# Patient Record
Sex: FEMALE | Race: WHITE | NOT HISPANIC OR LATINO | Employment: OTHER | URBAN - METROPOLITAN AREA
[De-identification: names, ages, dates, MRNs, and addresses within clinical notes are randomized per-mention and may not be internally consistent; named-entity substitution may affect disease eponyms.]

---

## 2017-04-18 ENCOUNTER — LAB VISIT (OUTPATIENT)
Dept: LAB | Facility: HOSPITAL | Age: 77
End: 2017-04-18
Attending: PSYCHIATRY & NEUROLOGY
Payer: MEDICARE

## 2017-04-18 ENCOUNTER — OFFICE VISIT (OUTPATIENT)
Dept: NEUROLOGY | Facility: CLINIC | Age: 77
End: 2017-04-18
Payer: MEDICARE

## 2017-04-18 VITALS
HEART RATE: 80 BPM | DIASTOLIC BLOOD PRESSURE: 60 MMHG | HEIGHT: 64 IN | SYSTOLIC BLOOD PRESSURE: 140 MMHG | WEIGHT: 145.94 LBS | BODY MASS INDEX: 24.92 KG/M2 | RESPIRATION RATE: 16 BRPM

## 2017-04-18 DIAGNOSIS — G43.019 INTRACTABLE MIGRAINE WITHOUT AURA AND WITHOUT STATUS MIGRAINOSUS: Primary | ICD-10-CM

## 2017-04-18 DIAGNOSIS — R55 SYNCOPE, UNSPECIFIED SYNCOPE TYPE: ICD-10-CM

## 2017-04-18 DIAGNOSIS — G60.9 HEREDITARY AND IDIOPATHIC NEUROPATHY: ICD-10-CM

## 2017-04-18 DIAGNOSIS — G43.019 INTRACTABLE MIGRAINE WITHOUT AURA AND WITHOUT STATUS MIGRAINOSUS: ICD-10-CM

## 2017-04-18 LAB
ALBUMIN SERPL BCP-MCNC: 3.7 G/DL
ALP SERPL-CCNC: 67 U/L
ALT SERPL W/O P-5'-P-CCNC: 19 U/L
ANION GAP SERPL CALC-SCNC: 7 MMOL/L
AST SERPL-CCNC: 18 U/L
BASOPHILS # BLD AUTO: 0.02 K/UL
BASOPHILS NFR BLD: 0.4 %
BILIRUB SERPL-MCNC: 0.1 MG/DL
BUN SERPL-MCNC: 24 MG/DL
CALCIUM SERPL-MCNC: 9.1 MG/DL
CHLORIDE SERPL-SCNC: 105 MMOL/L
CO2 SERPL-SCNC: 29 MMOL/L
CREAT SERPL-MCNC: 0.8 MG/DL
DIFFERENTIAL METHOD: ABNORMAL
EOSINOPHIL # BLD AUTO: 0.3 K/UL
EOSINOPHIL NFR BLD: 7.2 %
ERYTHROCYTE [DISTWIDTH] IN BLOOD BY AUTOMATED COUNT: 14.9 %
ERYTHROCYTE [SEDIMENTATION RATE] IN BLOOD BY WESTERGREN METHOD: 26 MM/HR
EST. GFR  (AFRICAN AMERICAN): >60 ML/MIN/1.73 M^2
EST. GFR  (NON AFRICAN AMERICAN): >60 ML/MIN/1.73 M^2
GLUCOSE SERPL-MCNC: 67 MG/DL
HCT VFR BLD AUTO: 31.4 %
HGB BLD-MCNC: 10 G/DL
LYMPHOCYTES # BLD AUTO: 1.5 K/UL
LYMPHOCYTES NFR BLD: 34.2 %
MCH RBC QN AUTO: 27.9 PG
MCHC RBC AUTO-ENTMCNC: 31.8 %
MCV RBC AUTO: 88 FL
MONOCYTES # BLD AUTO: 0.5 K/UL
MONOCYTES NFR BLD: 11.6 %
NEUTROPHILS # BLD AUTO: 2.1 K/UL
NEUTROPHILS NFR BLD: 46.6 %
PLATELET # BLD AUTO: 288 K/UL
PMV BLD AUTO: 9.5 FL
POTASSIUM SERPL-SCNC: 4.4 MMOL/L
PROT SERPL-MCNC: 6.6 G/DL
RBC # BLD AUTO: 3.59 M/UL
SODIUM SERPL-SCNC: 141 MMOL/L
TSH SERPL DL<=0.005 MIU/L-ACNC: 2.29 UIU/ML
WBC # BLD AUTO: 4.47 K/UL

## 2017-04-18 PROCEDURE — 82607 VITAMIN B-12: CPT

## 2017-04-18 PROCEDURE — 85651 RBC SED RATE NONAUTOMATED: CPT

## 2017-04-18 PROCEDURE — 80053 COMPREHEN METABOLIC PANEL: CPT

## 2017-04-18 PROCEDURE — 99999 PR PBB SHADOW E&M-NEW PATIENT-LVL III: CPT | Mod: PBBFAC,,, | Performed by: PSYCHIATRY & NEUROLOGY

## 2017-04-18 PROCEDURE — 84443 ASSAY THYROID STIM HORMONE: CPT

## 2017-04-18 PROCEDURE — 85025 COMPLETE CBC W/AUTO DIFF WBC: CPT

## 2017-04-18 PROCEDURE — 36415 COLL VENOUS BLD VENIPUNCTURE: CPT

## 2017-04-18 RX ORDER — LEVOTHYROXINE SODIUM 75 UG/1
75 TABLET ORAL DAILY
COMMUNITY
End: 2022-02-09

## 2017-04-18 RX ORDER — GABAPENTIN 300 MG/1
300 CAPSULE ORAL 3 TIMES DAILY
COMMUNITY
End: 2017-05-30

## 2017-04-18 RX ORDER — OXYBUTYNIN CHLORIDE 10 MG/1
20 TABLET, EXTENDED RELEASE ORAL DAILY
COMMUNITY
End: 2017-08-30

## 2017-04-18 RX ORDER — DULOXETIN HYDROCHLORIDE 60 MG/1
60 CAPSULE, DELAYED RELEASE ORAL DAILY
Qty: 30 CAPSULE | Refills: 11 | Status: SHIPPED | OUTPATIENT
Start: 2017-04-18 | End: 2017-05-30

## 2017-04-18 RX ORDER — ZOLPIDEM TARTRATE 10 MG/1
TABLET ORAL
Refills: 3 | COMMUNITY
Start: 2017-03-17 | End: 2017-05-30

## 2017-04-18 RX ORDER — OXYCODONE AND ACETAMINOPHEN 10; 325 MG/1; MG/1
TABLET ORAL
Refills: 0 | COMMUNITY
Start: 2017-03-01 | End: 2019-12-05

## 2017-04-18 RX ORDER — ATORVASTATIN CALCIUM 10 MG/1
10 TABLET, FILM COATED ORAL DAILY
COMMUNITY
End: 2017-08-30

## 2017-04-18 RX ORDER — ASPIRIN 81 MG/1
81 TABLET ORAL DAILY
COMMUNITY
End: 2019-03-22 | Stop reason: CLARIF

## 2017-04-18 RX ORDER — DULOXETIN HYDROCHLORIDE 60 MG/1
60 CAPSULE, DELAYED RELEASE ORAL DAILY
COMMUNITY
End: 2017-04-18 | Stop reason: SDUPTHER

## 2017-04-18 RX ORDER — TRIAMTERENE AND HYDROCHLOROTHIAZIDE 37.5; 25 MG/1; MG/1
1 CAPSULE ORAL EVERY OTHER DAY
Status: ON HOLD | COMMUNITY
End: 2017-06-23 | Stop reason: HOSPADM

## 2017-04-18 RX ORDER — OMEPRAZOLE 40 MG/1
40 CAPSULE, DELAYED RELEASE ORAL DAILY
Refills: 3 | COMMUNITY
Start: 2017-03-27 | End: 2023-12-08

## 2017-04-18 RX ORDER — LISINOPRIL 5 MG/1
TABLET ORAL
Refills: 5 | COMMUNITY
Start: 2017-02-09 | End: 2017-08-30

## 2017-04-18 RX ORDER — DICYCLOMINE HYDROCHLORIDE 20 MG/1
20 TABLET ORAL EVERY 6 HOURS PRN
COMMUNITY

## 2017-04-18 RX ORDER — BUTALBITAL, ACETAMINOPHEN AND CAFFEINE 50; 325; 40 MG/1; MG/1; MG/1
TABLET ORAL
Refills: 0 | COMMUNITY
Start: 2017-02-16 | End: 2017-04-18

## 2017-04-18 NOTE — PROGRESS NOTES
Consult from Dr Campos    HPI: Zenia Willams is a 76 y.o. female with headache which started 6 months ago.  She never had headaches or migraine prior. Pain starts on the top of the head bilaterally and radiates to both eyes. Pain increases to severe at times and she has to lay down due to the pain which can last days. She has light sensitivity. Headaches are occurring for 2 days 0-2 times per week. No associated numbness or weakness and no autonomic symptoms. No jaw claudication or temporal pain. No prior trauma. She takes NSAIDs prn vs oxycodone. No aura. She did see ENT/ all checked out ok  She had an episode of syncope in the shower a month ago. She has seen cardiologist, she follows routinely with Dr Campos. Patient recently had a cardiac work up 3 months prior including having carotid US and echo in the past 3 months (unremarkable). For the fainting:  Her  found her bruised and cold in the shower. She had a fall since then but did not faint. She has been falling a bit more over the past 1-2 years.   She was diagnosed with possible peripheral neuropathy by PCP years ago and takes gabapentin. Cymbalta helped headaches greatly (this was given by Dr Ott) but she ran out of this.   She did not have fainting spell on Cymbalta.   Patient's mother started with headaches, falling and fainting at a similar age and was diagnosed with alzheimer's at that time.   Patient's personal memory is ok per her and she is a former nurse. She thinks her  may notice some memory loss and she believes it is more difficulty to maintain perfect balance. No tremor.    Review of Systems   Constitutional: Negative for fever.   Eyes: Negative for double vision.   Respiratory: Negative for hemoptysis.    Cardiovascular: Negative for leg swelling.   Gastrointestinal: Negative for blood in stool.   Genitourinary: Negative for hematuria.   Musculoskeletal: Positive for falls.   Skin: Negative for rash.   Neurological:  Positive for headaches. Negative for speech change, focal weakness and seizures.   Psychiatric/Behavioral: Negative for memory loss.         I have reviewed all of this patient's past medical and surgical histories as well as family and social histories and active allergies and medications as documented in the electronic medical record.            Exam:  Gen Appearance, well developed/nourished in no apparent distress  CV: 2+ distal pulses with no edema or swelling  Neuro:  MS: Awake, alert, oriented to place, person, time, situation. Sustains attention. Recent/remote memory intact, Language is full to spontaneous speech/repetition/naming/comprehension. Fund of Knowledge is full  CN: Optic discs are flat with normal vasculature, PERRL, Extraoccular movements and visual fields are full. Normal facial sensation and strength, Hearing symmetric, Tongue and Palate are midline and strong. Shoulder Shrug symmetric and strong.  Motor: Normal bulk, tone, no abnormal movements. 5/5 strength bilateral upper/lower extremities with 1+ reflexes and no clonus  Sensory: symmetric to light touch, pain, temp, and vibration/proprioception. Romberg mildly positive  Cerebellar: Finger-nose,Heal-shin, Rapid alternating movements intact  Gait: Normal stance, no ataxia    Imaging:  Labs:      Assessment/Plan: Zenia Willams is a 76 y.o. female with 6 months of new onset severe migraine without aura type headaches.     I recommend:   1. MRI brain  to rule out structural lesion causing symptoms/findings given age of onset of symptoms  2. EEG given syncopal symptoms  3. CMP, CBC, TSH, B12, ESR today.   4. Note also more frequent falls and one syncopal episode. She sees Dr Campos regularly. She states she was remotely diagnosed clinically with peripheral neuropathy (she also takes gabapentin)   5. Cymbalta brief trial helped prevent headaches. May return to use at 60mg daily unless side effects.   RTC 6 weeks. Notify me if symptoms fail  to improve again as expected.   CC: Dr Campos

## 2017-04-18 NOTE — LETTER
April 18, 2017      Abad Campos MD  [address] [place] [address] [address]           [facility]  [address] [facility] [address]  Phone: [phone]  Fax: [phone]          Patient: Zenia Willams   MR Number: [id number]   YOB: 1940   Date of Visit: 4/18/2017       Dear Dr. Abad Campos:    Thank you for referring Zenia Willams to me for evaluation. Attached you will find relevant portions of my assessment and plan of care.    If you have questions, please do not hesitate to call me. I look forward to following Zenia Willams along with you.    Sincerely,    Christopher Blandon MD    Enclosure  CC:  No Recipients    If you would like to receive this communication electronically, please contact [email or web address] or [phone] to request more information on [employer] Link access.    For providers and/or their staff who would like to refer a patient to [facility], please contact us through our one-stop-shop provider referral line, [facility], at [phone].    If you feel you have received this communication in error or would no longer like to receive these types of communications, please e-mail [email or web address]

## 2017-04-18 NOTE — MR AVS SNAPSHOT
[facility]  [address] [facility] [address]  Phone: [phone]  Fax: [phone]                  Zenia Willams   2017 1:00 PM   Office Visit    Description:  Female : 1940   Provider:  Christopher Blandon MD   Department:  [facility]           Reason for Visit     Headache     Loss of Consciousness           Diagnoses this Visit        Comments    Intractable migraine without aura and without status migrainosus    -  Primary     Syncope, unspecified syncope type         Hereditary and idiopathic neuropathy                To Do List           Goals (5 Years of Data)     None      Follow-Up and Disposition     Return in about 6 weeks (around 2017).       These Medications        Disp Refills Start End    duloxetine (CYMBALTA) 60 MG capsule 30 capsule 11 2017     Take 1 capsule (60 mg total) by mouth once daily. - Oral    [id number] - [place], [place] - [address] AT [address] Ph #: [phone]         [facility] On Call     [facility] On Call Nurse Care Line -  Assistance  Unless otherwise directed by your provider, please contact [facility], our nurse care line that is available for  assistance.     Registered nurses in the [facility] provide: appointment scheduling, clinical advisement, health education, and other advisory services.  Call: [phone] (toll free)               Medications           Message regarding Medications     Verify the changes and/or additions to your medication regime listed below are the same as discussed with your clinician today.  If any of these changes or additions are incorrect, please notify your healthcare provider.        START taking these NEW medications        Refills    duloxetine (CYMBALTA) 60 MG capsule 11    Sig: Take 1 capsule (60 mg total) by mouth once daily.    Class: Normal    Route: Oral      STOP taking these  "medications     butalbital-acetaminophen-caffeine -40 mg (FIORICET, ESGIC) -40 mg per tablet TK 1 T PO Q 4 H PRF HA           Verify that the below list of medications is an accurate representation of the medications you are currently taking.  If none reported, the list may be blank. If incorrect, please contact your healthcare provider. Carry this list with you in case of emergency.           Current Medications     aspirin (ECOTRIN) 81 MG EC tablet Take 81 mg by mouth once daily.    atorvastatin (LIPITOR) 10 MG tablet Take 10 mg by mouth once daily.    dicyclomine (BENTYL) 20 mg tablet Take 20 mg by mouth every 6 (six) hours.    duloxetine (CYMBALTA) 60 MG capsule Take 1 capsule (60 mg total) by mouth once daily.    gabapentin (NEURONTIN) 300 MG capsule Take 300 mg by mouth 3 (three) times daily.    levothyroxine (SYNTHROID) 50 MCG tablet Take 50 mcg by mouth once daily.    lisinopril (PRINIVIL,ZESTRIL) 5 MG tablet TK 1 T PO QD    omeprazole (PRILOSEC) 40 MG capsule TK 1 C PO ONCE D    oxybutynin (DITROPAN XL) 10 MG 24 hr tablet Take 20 mg by mouth once daily.    oxycodone-acetaminophen (PERCOCET)  mg per tablet TK 1 T PO QID    triamterene-hydrochlorothiazide 37.5-25 mg (DYAZIDE) 37.5-25 mg per capsule Take 1 capsule by mouth every morning.    zolpidem (AMBIEN) 10 mg Tab TK 1 T PO QD HS           Clinical Reference Information           Your Vitals Were     BP Pulse Resp Height Weight BMI    140/60 (BP Location: Right arm, Patient Position: Sitting, BP Method: Manual) 80 16 5' 4" (1.626 m) 66.2 kg (145 lb 15.1 oz) 25.05 kg/m2      Blood Pressure          Most Recent Value    BP  (!)  140/60      Allergies as of 4/18/2017     Versed [Midazolam]      Immunizations Administered on Date of Encounter - 4/18/2017     None      Orders Placed During Today's Visit     Future Labs/Procedures Expected by Expires    CBC auto differential  4/18/2017 6/17/2018    Comprehensive metabolic panel  4/18/2017 " 4/18/2018    MRI Brain W WO Contrast  4/18/2017 4/18/2018    Sedimentation rate, manual  4/18/2017 6/17/2018    TSH  4/18/2017 6/17/2018    Vitamin B12  4/18/2017 4/18/2018    EEG,w/awake & asleep record  As directed 4/18/2018      Language Assistance Services     ATTENTION: Language assistance services are available, free of charge. Please call [phone].      ATENCIÓN: Si habla español, tiene a larkin disposición servicios gratuitos de asistencia lingüística. Llame al [phone].     CHÚ Ý: N?u b?n nói Ti?ng Vi?t, có các d?ch v? h? tr? ngôn ng? mi?n phí dành cho b?n. G?i s? [phone].         [facility] complies with applicable Federal civil rights laws and does not discriminate on the basis of race, color, national origin, age, disability, or sex.

## 2017-04-19 LAB — VIT B12 SERPL-MCNC: 491 PG/ML

## 2017-04-20 ENCOUNTER — TELEPHONE (OUTPATIENT)
Dept: NEUROLOGY | Facility: CLINIC | Age: 77
End: 2017-04-20

## 2017-04-20 NOTE — TELEPHONE ENCOUNTER
Pt has an MRI scheduled for 4/27/17 and pt states she will need a medication for prior to the MRI due to being claustrophobic. Please advise.

## 2017-04-21 RX ORDER — DIAZEPAM 5 MG/1
TABLET ORAL
Qty: 1 TABLET | Refills: 0 | Status: SHIPPED | OUTPATIENT
Start: 2017-04-21 | End: 2017-05-30

## 2017-04-21 NOTE — TELEPHONE ENCOUNTER
Ok. Has this been the case after her reaction to versed. In other words, has she had the valium many times after that versed reaction with no reaction to valium?  Let me know and if So I can prescribe the valium

## 2017-04-21 NOTE — TELEPHONE ENCOUNTER
Valium 5 mg,#1,take one by mouth 30 minutes prior to MRI,no refills. Called to [facility] on Pt's file.spoke to Rommel.

## 2017-04-21 NOTE — TELEPHONE ENCOUNTER
Spoke with patient she states that she has had Valium mean times, she has taken it for MRI and she has to take it when she goes to the dentist.

## 2017-04-21 NOTE — TELEPHONE ENCOUNTER
Patient states she was told when she was much younger that she had reaction to Versed and was always told to let prescribers know. She has had Valium since then.

## 2017-04-21 NOTE — TELEPHONE ENCOUNTER
Patient has a versed allergy listed.Valium and Xanax can't be given- they are in the same class of medications.  Would she be able to set up a time to view our MRI machine and see if she could tolerate?

## 2017-04-21 NOTE — TELEPHONE ENCOUNTER
Ok thanks. Valium prescribed per orders. Instruct the patient to avoid driving to and from the MRI

## 2017-04-27 ENCOUNTER — HOSPITAL ENCOUNTER (OUTPATIENT)
Dept: PULMONOLOGY | Facility: HOSPITAL | Age: 77
Discharge: HOME | End: 2017-04-27
Attending: PSYCHIATRY & NEUROLOGY
Payer: MEDICARE

## 2017-04-27 ENCOUNTER — HOSPITAL ENCOUNTER (OUTPATIENT)
Dept: RADIOLOGY | Facility: HOSPITAL | Age: 77
Discharge: HOME | End: 2017-04-27
Attending: PSYCHIATRY & NEUROLOGY
Payer: MEDICARE

## 2017-04-27 DIAGNOSIS — G43.019 INTRACTABLE MIGRAINE WITHOUT AURA AND WITHOUT STATUS MIGRAINOSUS: ICD-10-CM

## 2017-04-27 DIAGNOSIS — R55 SYNCOPE, UNSPECIFIED SYNCOPE TYPE: ICD-10-CM

## 2017-04-27 PROCEDURE — 70553 MRI BRAIN STEM W/O & W/DYE: CPT | Mod: 26,,, | Performed by: RADIOLOGY

## 2017-04-27 PROCEDURE — 70553 MRI BRAIN STEM W/O & W/DYE: CPT | Mod: TC

## 2017-04-27 PROCEDURE — A9585 GADOBUTROL INJECTION: HCPCS | Performed by: PSYCHIATRY & NEUROLOGY

## 2017-04-27 PROCEDURE — 95816 EEG AWAKE AND DROWSY: CPT | Mod: 26,,, | Performed by: PSYCHIATRY & NEUROLOGY

## 2017-04-27 PROCEDURE — 25500020 PHARM REV CODE 255: Performed by: PSYCHIATRY & NEUROLOGY

## 2017-04-27 PROCEDURE — 95819 EEG AWAKE AND ASLEEP: CPT

## 2017-04-27 RX ORDER — GADOBUTROL 604.72 MG/ML
6 INJECTION INTRAVENOUS
Status: COMPLETED | OUTPATIENT
Start: 2017-04-27 | End: 2017-04-27

## 2017-04-27 RX ADMIN — GADOBUTROL 6 ML: 604.72 INJECTION INTRAVENOUS at 08:04

## 2017-04-27 NOTE — PROGRESS NOTES
EEG REPORT      Patients Name:  Zenia Willams  Date of Recordin17  Technician: Duyen  Patient :[birth year]    Referring Physician: Dr. Blandon  Reason for Exam: Headache, syncope  Notable medications: Neurontin, valium, ambien      INTRODUCTION:  This EEG was  recorded using 10-channels and the International 10/20 electrode placement system.  The patient was  sleep deprived.    FINDINGS:  This EEG was recording during wakefulness and drowsiness only.  A 10  Hz posterior dominant rhythm was persistently observed and of normal amplitude and symmetry.   Mild muscle artifact, Eye Blinks, Beta frequency artifact occurred throughout the recording  Focal slowing was not observed.   Epileptiform activity was not observed    Hyperventilation: Was not Performed    Photic Stimulation: Was not Performed    Clinical Impression:  Normal awake and  Drowsy EEG.      Christopher Blandon MD

## 2017-04-30 PROBLEM — M81.0 AGE-RELATED OSTEOPOROSIS WITHOUT CURRENT PATHOLOGICAL FRACTURE: Status: ACTIVE | Noted: 2017-04-30

## 2017-05-30 ENCOUNTER — OFFICE VISIT (OUTPATIENT)
Dept: NEUROLOGY | Facility: CLINIC | Age: 77
End: 2017-05-30
Payer: MEDICARE

## 2017-05-30 VITALS
RESPIRATION RATE: 18 BRPM | BODY MASS INDEX: 24.13 KG/M2 | HEART RATE: 80 BPM | HEIGHT: 64 IN | DIASTOLIC BLOOD PRESSURE: 88 MMHG | SYSTOLIC BLOOD PRESSURE: 150 MMHG | WEIGHT: 141.31 LBS

## 2017-05-30 DIAGNOSIS — G47.00 INSOMNIA, UNSPECIFIED TYPE: Primary | ICD-10-CM

## 2017-05-30 DIAGNOSIS — T50.905A: ICD-10-CM

## 2017-05-30 PROCEDURE — 1126F AMNT PAIN NOTED NONE PRSNT: CPT | Performed by: PSYCHIATRY & NEUROLOGY

## 2017-05-30 PROCEDURE — 99213 OFFICE O/P EST LOW 20 MIN: CPT | Mod: PBBFAC | Performed by: PSYCHIATRY & NEUROLOGY

## 2017-05-30 PROCEDURE — 99999 PR PBB SHADOW E&M-EST. PATIENT-LVL III: CPT | Mod: PBBFAC,,, | Performed by: PSYCHIATRY & NEUROLOGY

## 2017-05-30 PROCEDURE — 1159F MED LIST DOCD IN RCRD: CPT | Performed by: PSYCHIATRY & NEUROLOGY

## 2017-05-30 PROCEDURE — 99214 OFFICE O/P EST MOD 30 MIN: CPT | Mod: S$PBB | Performed by: PSYCHIATRY & NEUROLOGY

## 2017-05-30 NOTE — PROGRESS NOTES
HPI: Zenia Willams is a 76 y.o. female with 6 months of new onset severe migraine without aura type headaches.   Patient restarted Cymbalta since the last visit but she was too sedated with this medication.   She stopped this medication and the symptoms improved.   She no longer has any headaches.   Since the last visit, anemia is new per patient but she refuses colonoscopy. PCP placed her on iron after reviewing the CBC done here.  She is now off of gabapentin (states she does not need this)  She seems somewhat confused at night after taking her ambien per . She will leave things on the counter or leave the cabinets open.   She states she has poor sleep at night, but naps a few times during the day.  No more fainting and no more falls.  She states she only rarely takes percocet    Review of Systems   Constitutional: Negative for fever.   Eyes: Negative for double vision.   Respiratory: Negative for hemoptysis.    Cardiovascular: Negative for leg swelling.   Gastrointestinal: Negative for blood in stool.   Genitourinary: Negative for hematuria.   Musculoskeletal: Negative for falls.   Skin: Negative for rash.   Neurological: Negative for loss of consciousness and headaches.   Psychiatric/Behavioral: The patient has insomnia.          I have reviewed all of this patient's past medical and surgical histories as well as family and social histories and active allergies and medications as documented in the electronic medical record.        Exam:  Gen Appearance, well developed/nourished in no apparent distress  CV: 2+ distal pulses with no edema or swelling  Neuro:  MS: Awake, alert, oriented to place, person, time, situation. Sustains attention. Recent/remote memory intact, Language is full to spontaneous speech/comprehension. Fund of Knowledge is full  CN: Optic discs are flat with normal vasculature, PERRL, Extraoccular movements and visual fields are full. Normal facial sensation and strength,  Hearing symmetric, Tongue and Palate are midline and strong. Shoulder Shrug symmetric and strong.  Motor: Normal bulk, tone, no abnormal movements. 5/5 strength bilateral upper/lower extremities with 1+ reflexes and no clonus  Sensory: symmetric to  temp, and vibration Romberg mildly positive  Cerebellar: Finger-nose,Heal-shin, Rapid alternating movements intact  Gait: Normal stance, no ataxia    Imaging: MRI brain 4/2017: No acute process seen and no abnormal enhancement seen.    Involutional changes small vessel ischemic change.      EEG 4/2017: Clinical Impression:  Normal awake and  Drowsy EEG.     Labs:CMP, CBC, TSH, B12, ESR  Showed only anemia for which patient was asked to review with PCP/ make sure she was up to date on colonscopy      Assessment/Plan: Zenia Willams is a 76 y.o. female with 6 months of new onset severe migraine without aura type headaches.     I recommend:   1. Cymbalta  Was not well tolerated at 60mg daily (did help headaches prior).   2. She is no longer having headaches. NO treatment needed unless further headaches.   3. Patient's  reports she is a bit forgetful at night after taking Ambien. D/c Ambien. Avoid napping during the day. Sleep hygiene reviewed. Can use Melatonin 2mg-3mg if needed 30 minutes prior to bedtime. Consider further work up/treatment if memory not improved  4. Note also more frequent falls and one syncopal episode. She sees Dr Campos regularly. She states she was remotely diagnosed clinically with peripheral neuropathy (she stopped gabapentin)   RTC 12 weeks

## 2017-05-30 NOTE — PATIENT INSTRUCTIONS
May take 2-3mg of Melatonin 30 minutes before bedtime  STOP AMBIEN    Insomnia  Insomnia is repeated difficulty going to sleep or staying asleep, or both. Whether you have insomnia is not defined by a specific amount of sleep. Different people need different amounts of sleep, and you may need more or less sleep at different times of your life.  There are 3 major types of insomnia:  short-term, chronic, and other.  Short-term, or acute insomnia lasts less than 3 months.  The symptoms are temporary and can be linked directly to a stressor, such as the death of a loved one, financial problems, or a new physical problem.  Short-term insomnia stops when the stressor resolves or the person adapts to its presence.  Chronic insomnia occurs at least 3 times a week and lasts longer than 3 months.  Chronic insomnia can occur when either the cause of the sleeping problem is not clear, or the insomnia does not get better when the stressor is resolved. A number of other criteria are also used to make the diagnosis of chronic insomnia.   Other insomnia is the third type of insomnia-related sleep disorders.  This description applies to people who have problems getting to sleep or staying asleep, but do not meet all of the factors that describe either short-term or chronic insomnia.    Many things cause insomnia. Different people may have different causes. It can be from an underlying medical or psychological condition, or lifestyle. It can also be primary insomnia, which means no cause can be found.  Causes of insomnia include:  · Chronic medical problems- heart disease, gastrointestinal problems, hormonal changes, breathing problems  · Anxiety  · Stress  · Depression  · Pain  · Work schedule  · Sleep apnea  · Illegal drugs  · Certain medicines  Many different medidcines can affect your sleep, such as stimulants, caffeine, alcohol, some decongestants, and diet pills. Other medicines may include some types of blood pressure  pills, steroids, asthma medicines, antihistamines, antidepressants, seizure medicines and statins. Not all of these will affect your sleep, and they shouldnt be stopped without talking to your doctor.  Symptoms of insomnia can include:  · Lying awake for long periods at night before falling asleep  · Waking up several times during the night  · Waking up early in the morning and not being able to get back to sleep  · Feeling tired and not refreshed by sleep  · Not being able to function properly during the day and finding it hard to concentrate  · Irritability  · Tiredness and fatigue during the day  Home care  1. Review your medicines with your doctor or pharmacist to find out if they can cause insomnia. Not all medicines will affect your sleep, but they shouldn't be stopped without reviewing them with your doctor. There may be serious side effects and consequences from suddenly stopping your medicines. Not taking them may cause strokes, heart attacks, and many other problems.  2. Caffeine, smoking and alcohol also affect sleep. Limit your daily use and do not use these before bedtime. Alcohol may make you sleepy at first, but as its effects wear off, you may awaken a few hours later and have trouble returning to sleep.  3. Do not exercise, eat or drink large amounts of liquid within 2 hours of your bedtime.  4. Improve your sleep habits. Have a fixed bed and wake-up time. Try to keep noise, light and heat in your bedroom at a comfortable level. Try using earplugs or eyeshades if needed.   5. Avoid watching TV in bed.  6. If you do not fall asleep within 30 minutes, try to relax by reading or listening to soft music.  7. Limit daytime napping to one 30 minute period, early in the day.  8. Get regular exercise. Find other ways to lessen your stress level.  9. If a medicine was prescribed to help reset your sleep patterns, take it as directed. Sleeping pills are intended for short-term use, only. If taken for too  long, the effect wears off while the risk of physical addiction and psychological dependence increases.  Sleep diary  If the cause isnt obvious and it is not improving, try keeping a sleep diary for a couple of weeks. Include in it:  · The time you go to bed  · How long it takes to fall asleep  · How many times you wake up  · What time you wake up  · Your meal times and what you eat  · What time you drink alcohol  · Your exercise habits and times  Follow-up care  Follow up with your healthcare provider, or as advised. If X-rays or CT scans were done, you will be notified if there is a change in the reading, especially if it affects treatment.  Call 911  Call 911 if any of these occur:  · Trouble breathing  · Confusion or trouble waking  · Fainting or loss of consciousness  · Rapid heart rate  · New chest, arm, shoulder, neck or upper back pain  · Trouble with speech or vision, weakness of an arm or leg  · Trouble walking or talking, loss of balance, numbness or weakness in one side of your body, facial droop  When to seek medical advice  Call your healthcare provider right away if any of these occur:  · Extreme restlessness or irritability  · Confusion or hallucinations (seeing or hearing things that are not there)  · Anxiety, depression  · Several days without sleeping  Date Last Reviewed: 11/19/2015  © 9345-0450 [employer]. [address] [place] [address], [place] [address]. All rights reserved. This information is not intended as a substitute for professional medical care. Always follow your healthcare professional's instructions.        Treating Insomnia  Good sleeping habits are a key part of treatment. If needed, some medications may help you sleep better at first. Making healthy lifestyle changes and learning to relax can improve your sleep. Treating insomnia takes commitment, but trust that your efforts will pay off. Talk to your health care provider before taking any medication.    Healthy  lifestyle  Your lifestyle affects your health and your sleep. Here are some healthy habits:  · Keep a regular sleep schedule. Go to bed and get up at the same time each day.  · Exercise regularly. It may help you reduce stress. Avoid strenuous exercise for 2 to 4 hours before bedtime.  · Avoid or limit naps, especially in the late afternoon.  · Use your bed only for sleep and sex.  · Dont spend too much time in bed trying to fall asleep. If you cant fall asleep, get up and do something (no electronics) until you become tired and drowsy.  · Avoid or limit caffeine and nicotine for up to 6 hours before bedtime. They can keep you awake at night.   · Also avoid alcohol for at least 4 to 6 hours before bedtime. It may help you fall asleep at first, but you will have more awakenings throughout the night, and your sleep will not be restful.  Before bedtime  To sleep better every night, try these tips:  · Have a bedtime routine to let your body and mind know when its time to sleep.  · Think of going to bed as relaxing and enjoyable. Sleep will come sooner.  · If your worries dont let you sleep, write them down in a diary. Then close it, and go to bed.  · Make sure the room is not too hot or too cold. If its not dark enough, an eye mask can help. If its noisy, try using earplugs.  Learn to relax  Stress, anxiety, and body tension may keep you awake at night. To unwind before bedtime, try a warm bath, meditation, or yoga. Also, try the following:  · Deep breathing. Sit or lie back in a chair. Take a slow, deep breath. Hold it for 5 counts. Then breathe out slowly through your mouth. Keep doing this until you feel relaxed.  · Progressive muscle relaxation. Tense and then relax the muscles in your body as you breathe deeply. Start with your feet and work up your body to your neck and face.  Date Last Reviewed: 7/18/2015  © 4602-4968 [employer]. [address] [place] [address], [place] [address]. All rights reserved.  This information is not intended as a substitute for professional medical care. Always follow your healthcare professional's instructions.        What is Insomnia?  Do you have trouble falling asleep? Do you wake up often during the night? Or, maybe you wake up too early in the morning. You may be suffering from insomnia. Talk to your health care provider if it lasts longer than a few weeks and you feel tired most of the time.    What causes insomnia?  Some common causes of insomnia are:  · Medical problems such as pain, depression, medication side effects, or trouble breathing  · Circadian rhythm disorder, a shift in the bodys normal 24-hour activity cycle  · Lifestyle factors such as a changing sleep schedule, lack of exercise, or too much caffeine  · Sleep settings such as a poor mattress, noise, or a room thats too hot or too cold  · Stress such as problems at work, money worries, or family events  Talk to your health care provider  Describe your sleeping problems to your health care provider. Try to keep a daily sleep diary for a couple weeks. Write down the time you go to bed, the time you wake up, and anything that seems to affect your sleep. Your health care provider can work with you to develop a treatment plan. You may need to learn good sleeping habits and make some lifestyle changes. If you have any medical problems, these may need to be treated first.  Date Last Reviewed: 7/18/2015 © 2000-2016 [employer]. [address] [place] [address]. All rights reserved. This information is not intended as a substitute for professional medical care. Always follow your healthcare professional's instructions.        Sleep and Women: Life Stages  A woman goes through many stages during her lifetime. These stages are a natural part of being a woman. Physical and emotional changes take place during the menstrual cycle, pregnancy, motherhood, and menopause. These changes can affect sleep, even  cause insomnia. But there are ways you can improve your sleep.     Try using a pillow to support your body while you sleep.   Menstrual cycle  Many women have physical or emotional symptoms before or during their period. These symptoms may include mood swings, cramping, and fatigue. They can affect how you feel and how you sleep. Eating a well-balanced diet low in fat, salt, and sugar may reduce your symptoms. Vitamin and mineral supplements may also help. Regular exercise can reduce stress and relieve some of your symptoms. You will have more energy during the day and be more tired at bedtime. Morning or afternoon exercise is best. Nighttime exercise may affect your sleep.  Pregnancy  · Take a warm shower before bed.  · Ask your partner to massage your shoulders, neck, or back.  · Sleep with pillows under your stomach and back, and between your knees.  · Avoid naps after 3 pm.  · Exercise and practice good posture. Sleep on a firm mattress.  · To reduce frequent urination at night, drink most of your fluids earlier in the day.  · Sleep with your upper body raised several inches. Dont lie down for 2 hours after you eat.  · Walk, stretch, or massage restless legs.  · Avoid or limit coffee, black tea, and cola. These may keep you awake at night.  · Try relaxation techniques.  Motherhood  · Ask for help when you need it. Accept help when its offered.  · Many new mothers feel a little down for a few weeks. Share your feelings with your loved ones. Talk to your health care provider if your feelings get in the way of sleeping or eating.  · Try to adjust your babys sleep to fit a day-night cycle. At night, have lights dim and the setting quiet. During the day, keep your baby active longer. Then he or she will sleep better at night.  · Take a daily walk with your baby. Fresh air and daylight will help you both sleep better.  · When your baby sleeps, lie down for a nap or put your feet up and rest.  · Avoid or limit  coffee, black tea, and cola. These may keep you awake at night.  Menopause  Menopause is when you stop having periods for good. Just before menopause, your body produces fewer female hormones. This can cause physical, mental, or emotional changes that may affect your sleep. Try these tips:  · If you have hot flashes and night sweats, avoid caffeine and spicy foods at nighttime. Wear a cotton nightgown and put cotton sheets on your bed. Keep the room cool and dark. Use a portable fan.  · Mood swings can cause insomnia, memory loss, fatigue, or depression. If these affect your sleep, talk to your health care provider. Lift your spirits by exercising and doing things you enjoy.  · Try relaxation techniques. Exercise regularly.  · Avoid alcohol.  · Avoid naps after 3 pm.  · Only use the bedroom for sleep and sex.  Date Last Reviewed: 4/26/2015  © 1818-4631 [employer]. [address] [place] [address], [place] [address]. All rights reserved. This information is not intended as a substitute for professional medical care. Always follow your healthcare professional's instructions.

## 2017-06-22 PROBLEM — K59.00 CONSTIPATION: Status: ACTIVE | Noted: 2017-06-22

## 2017-06-22 PROBLEM — K56.41 FECAL IMPACTION: Status: ACTIVE | Noted: 2017-06-22

## 2017-06-23 PROBLEM — I10 ESSENTIAL HYPERTENSION: Chronic | Status: ACTIVE | Noted: 2017-06-23

## 2017-08-30 ENCOUNTER — OFFICE VISIT (OUTPATIENT)
Dept: NEUROLOGY | Facility: CLINIC | Age: 77
End: 2017-08-30
Payer: MEDICARE

## 2017-08-30 VITALS
HEART RATE: 74 BPM | SYSTOLIC BLOOD PRESSURE: 146 MMHG | BODY MASS INDEX: 23.66 KG/M2 | WEIGHT: 138.56 LBS | HEIGHT: 64 IN | DIASTOLIC BLOOD PRESSURE: 74 MMHG | RESPIRATION RATE: 16 BRPM

## 2017-08-30 DIAGNOSIS — M25.552 LEFT HIP PAIN: ICD-10-CM

## 2017-08-30 DIAGNOSIS — I10 ESSENTIAL HYPERTENSION: Chronic | ICD-10-CM

## 2017-08-30 DIAGNOSIS — R32 URINARY INCONTINENCE, UNSPECIFIED TYPE: ICD-10-CM

## 2017-08-30 DIAGNOSIS — R15.9 INCONTINENCE OF FECES, UNSPECIFIED FECAL INCONTINENCE TYPE: ICD-10-CM

## 2017-08-30 DIAGNOSIS — M54.6 THORACIC SPINE PAIN: ICD-10-CM

## 2017-08-30 DIAGNOSIS — M54.50 ACUTE MIDLINE LOW BACK PAIN WITHOUT SCIATICA: Primary | ICD-10-CM

## 2017-08-30 DIAGNOSIS — G43.019 INTRACTABLE MIGRAINE WITHOUT AURA AND WITHOUT STATUS MIGRAINOSUS: ICD-10-CM

## 2017-08-30 PROCEDURE — 99999 PR PBB SHADOW E&M-EST. PATIENT-LVL IV: CPT | Mod: PBBFAC,,, | Performed by: NURSE PRACTITIONER

## 2017-08-30 PROCEDURE — 99999 PR STA SHADOW: CPT | Mod: PBBFAC,,, | Performed by: NURSE PRACTITIONER

## 2017-08-30 PROCEDURE — 99214 OFFICE O/P EST MOD 30 MIN: CPT | Mod: S$PBB | Performed by: NURSE PRACTITIONER

## 2017-08-30 PROCEDURE — 99214 OFFICE O/P EST MOD 30 MIN: CPT | Mod: PBBFAC | Performed by: NURSE PRACTITIONER

## 2017-08-30 RX ORDER — TRAZODONE HYDROCHLORIDE 50 MG/1
100 TABLET ORAL NIGHTLY
COMMUNITY
Start: 2017-08-02 | End: 2019-11-25 | Stop reason: DRUGHIGH

## 2017-08-30 RX ORDER — TRAMADOL HYDROCHLORIDE 50 MG/1
1 TABLET ORAL EVERY 8 HOURS PRN
COMMUNITY
Start: 2017-08-02 | End: 2017-12-19

## 2017-08-30 RX ORDER — IRBESARTAN 150 MG/1
1 TABLET ORAL DAILY
COMMUNITY
Start: 2017-08-18 | End: 2017-12-19

## 2017-08-30 NOTE — PROGRESS NOTES
HPI: Zenia Willams is a 76 y.o. female with new onset severe migraine without aura type headaches in 2016. She has a history of a lumbar laminectomy 20+ years ago in [place].     She presents today for a routine follow up visit. She has complaint of lower back pain, which began last week, and lasted 4 days. The pain was severe enough to have difficulty getting out of bed. The pain is localized to the center of her back and does not radiate down her legs. The pain is stabbing in quality. She took Oxycodone last week, which was ineffective. She then tried Celebrex, which was effective. She denies injury. She admits to having urinary incontinence, as well as urinary retention that began 3 weeks ago.    She is having some headaches, which are bifrontal, and are described as a dull throbbing. This occurs a couple times per week; however, her headaches are tolerable and do not require treatment at this time.     Her insomnia is well managed with Trazodone, which does not result in Ambien.     Review of Systems   Constitutional: Negative for fever.   Eyes: Negative for double vision.   Respiratory: Negative for hemoptysis.    Cardiovascular: Negative for leg swelling.   Gastrointestinal: Negative for blood in stool.   Genitourinary: Negative for hematuria.        Urinary incontinence and urinary retention   Musculoskeletal: Positive for back pain. Negative for falls.   Skin: Negative for rash.   Neurological: Positive for headaches. Negative for loss of consciousness.   Psychiatric/Behavioral: The patient has insomnia.      I have reviewed all of this patient's past medical and surgical histories as well as family and social histories and active allergies and medications as documented in the electronic medical record.    Exam:  Gen Appearance, well developed/nourished in no apparent distress  CV: 2+ distal pulses with no edema or swelling  Neuro:  MS: Awake, alert, oriented to place, person, time, situation.  Sustains attention. Recent/remote memory intact, Language is full to spontaneous speech/comprehension. Fund of Knowledge is full  CN: Optic discs are flat with normal vasculature, PERRL, Extraoccular movements and visual fields are full. Normal facial sensation and strength, Hearing symmetric, Tongue and Palate are midline and strong. Shoulder Shrug symmetric and strong.  Motor: Normal bulk, tone, no abnormal movements. 5/5 strength bilateral upper/lower extremities with 1+ reflexes and no clonus  Sensory: symmetric to  temp, and vibration Romberg mildly positive  Cerebellar: Finger-nose,Heal-shin, Rapid alternating movements intact  Gait: Normal stance, no ataxia, arthralgic gait  MSK survey: negative SLR test bilaterally    Imaging: MRI brain 4/2017: No acute process seen and no abnormal enhancement seen.    Involutional changes small vessel ischemic change.    EEG 4/2017: Clinical Impression:  Normal awake and  Drowsy EEG.     Labs:CMP, CBC, TSH, B12, ESR  Showed only anemia for which patient was asked to review with PCP/ make sure she was up to date on colonscopy    Assessment/Plan: Zenia Willasm is a 76 y.o. female with 6 months of new onset severe migraine without aura type headaches.     I recommend:   1. MRI L-spine, left hip X-rays, and T-spine X-rays to assess for stenosis/degenerative changes.   2. EMG/NCS BLE to assess for lumbar radiculopathy. She states she was remotely diagnosed clinically with peripheral neuropathy (she stopped gabapentin).  3. Headaches are mild and are tolerable at this time. No treatment needed. Cymbalta previously helped her; however, 60 mg was not tolerated.   4. Her insomnia is managed with Trazodone without side effects. She had memory issues with Ambien.     RTC for EMG  FU 3 months

## 2017-09-01 ENCOUNTER — HOSPITAL ENCOUNTER (OUTPATIENT)
Dept: RADIOLOGY | Facility: HOSPITAL | Age: 77
Discharge: HOME | End: 2017-09-01
Attending: NURSE PRACTITIONER
Payer: MEDICARE

## 2017-09-01 DIAGNOSIS — M25.552 LEFT HIP PAIN: ICD-10-CM

## 2017-09-01 DIAGNOSIS — M54.6 THORACIC SPINE PAIN: ICD-10-CM

## 2017-09-01 DIAGNOSIS — M54.50 ACUTE MIDLINE LOW BACK PAIN WITHOUT SCIATICA: ICD-10-CM

## 2017-09-01 PROCEDURE — 73502 X-RAY EXAM HIP UNI 2-3 VIEWS: CPT | Mod: TC,LT

## 2017-09-01 PROCEDURE — 72148 MRI LUMBAR SPINE W/O DYE: CPT | Mod: 26,,, | Performed by: RADIOLOGY

## 2017-09-01 PROCEDURE — 72070 X-RAY EXAM THORAC SPINE 2VWS: CPT | Mod: 26,,, | Performed by: RADIOLOGY

## 2017-09-01 PROCEDURE — 72070 X-RAY EXAM THORAC SPINE 2VWS: CPT | Mod: TC

## 2017-09-01 PROCEDURE — 73502 X-RAY EXAM HIP UNI 2-3 VIEWS: CPT | Mod: 26,LT,, | Performed by: RADIOLOGY

## 2017-09-01 PROCEDURE — 72148 MRI LUMBAR SPINE W/O DYE: CPT | Mod: TC

## 2017-10-05 ENCOUNTER — PROCEDURE VISIT (OUTPATIENT)
Dept: NEUROLOGY | Facility: CLINIC | Age: 77
End: 2017-10-05
Payer: MEDICARE

## 2017-10-05 DIAGNOSIS — R32 URINARY INCONTINENCE, UNSPECIFIED TYPE: ICD-10-CM

## 2017-10-05 DIAGNOSIS — R15.9 INCONTINENCE OF FECES, UNSPECIFIED FECAL INCONTINENCE TYPE: ICD-10-CM

## 2017-10-05 DIAGNOSIS — M54.50 ACUTE MIDLINE LOW BACK PAIN WITHOUT SCIATICA: ICD-10-CM

## 2017-10-05 DIAGNOSIS — M54.6 THORACIC SPINE PAIN: ICD-10-CM

## 2017-10-05 DIAGNOSIS — M25.552 LEFT HIP PAIN: ICD-10-CM

## 2017-10-05 PROCEDURE — 99999 PR STA SHADOW: CPT | Mod: PBBFAC,,, | Performed by: PSYCHIATRY & NEUROLOGY

## 2017-10-05 PROCEDURE — 95910 NRV CNDJ TEST 7-8 STUDIES: CPT | Mod: 26,S$PBB | Performed by: PSYCHIATRY & NEUROLOGY

## 2017-10-05 PROCEDURE — 95886 MUSC TEST DONE W/N TEST COMP: CPT | Mod: 26,S$PBB | Performed by: PSYCHIATRY & NEUROLOGY

## 2017-10-05 RX ORDER — CELECOXIB 200 MG/1
200 CAPSULE ORAL 2 TIMES DAILY
COMMUNITY
End: 2019-03-22 | Stop reason: CLARIF

## 2017-10-05 NOTE — PROCEDURES
EMG - 2 Extremities  Date/Time: 10/5/2017 12:28 PM  Performed by: TOYA BLANDON  Authorized by: YUNIEL HERNANDEZ     Nerve conduction test  Date/Time: 10/5/2017 12:28 PM  Performed by: TOYA BLANDON  Authorized by: YUNIEL HERNANDEZ       REPORT OF EMG and NERVE CONDUCTION STUDY    Name: Zenia Willams  Date of Study:  10/5/17  Referring Provider: RAHUL Hernandez  Test Performed by:  MD Jamia  Full Values Attached  Informed Consent Scanned.   No anesthesia used.   Amount of Blood Loss: none. The patient tolerated this procedure well.       Informed consent was obtained prior to performing this study. Two patient identifiers were confirmed with the patient prior to performing this study. A time out to determine correct patient and and agreement on procedure performed was conducted prior to the concentric needle examination.    Reason for the study:  Back pain, reported history of peripheral neuropathy      Findings:   Nerve conduction studies of thebilateral peroneal motor, bilateral tibial motor, bilateral sural nerves and bilateral H-reflexes were performed.   Amplitudes, distal latencies, conduction velocities, and F-waves were normal. H reflexes were symetric  EMG of selected muscles of the bilateral legs were performed as indicated on the attached sheets. Paraspinal muscles were not sampled due to lumbar laminectomy scar.   Insertional activity was normal without fasciculation or fibrillation, normal sized and phasia of motor units.      Impression:  Abnormal Study secondary to the Presence of:    Normal EMG/NCS of the legs. NO evidence of Large Fiber Peripheral neuropathy or Radiculopathy found on this study    Toya Blandon M.D. [facility].     Recommendations: For her spinal pain, she would like to see pain management. Consult placed.  RTC as scheduled  CC: RAHUL Hernandez.

## 2017-11-03 ENCOUNTER — OFFICE VISIT (OUTPATIENT)
Dept: PAIN MEDICINE | Facility: CLINIC | Age: 77
End: 2017-11-03
Payer: MEDICARE

## 2017-11-03 ENCOUNTER — HOSPITAL ENCOUNTER (OUTPATIENT)
Dept: RADIOLOGY | Facility: HOSPITAL | Age: 77
Discharge: HOME | End: 2017-11-03
Attending: ANESTHESIOLOGY
Payer: MEDICARE

## 2017-11-03 VITALS
HEIGHT: 64 IN | SYSTOLIC BLOOD PRESSURE: 118 MMHG | DIASTOLIC BLOOD PRESSURE: 62 MMHG | WEIGHT: 135.38 LBS | RESPIRATION RATE: 18 BRPM | BODY MASS INDEX: 23.11 KG/M2 | HEART RATE: 74 BPM

## 2017-11-03 DIAGNOSIS — G89.29 CHRONIC LEFT-SIDED THORACIC BACK PAIN: ICD-10-CM

## 2017-11-03 DIAGNOSIS — M54.6 CHRONIC LEFT-SIDED THORACIC BACK PAIN: ICD-10-CM

## 2017-11-03 DIAGNOSIS — M79.18 MYOFASCIAL MUSCLE PAIN: Primary | ICD-10-CM

## 2017-11-03 DIAGNOSIS — M47.814 THORACIC SPONDYLOARTHRITIS: ICD-10-CM

## 2017-11-03 PROCEDURE — 72040 X-RAY EXAM NECK SPINE 2-3 VW: CPT | Mod: TC

## 2017-11-03 PROCEDURE — 96372 THER/PROPH/DIAG INJ SC/IM: CPT | Mod: PBBFAC

## 2017-11-03 PROCEDURE — 99204 OFFICE O/P NEW MOD 45 MIN: CPT | Mod: S$PBB | Performed by: ANESTHESIOLOGY

## 2017-11-03 PROCEDURE — 72040 X-RAY EXAM NECK SPINE 2-3 VW: CPT | Mod: 26,,, | Performed by: RADIOLOGY

## 2017-11-03 PROCEDURE — 99999 PR STA SHADOW: CPT | Mod: PBBFAC,,, | Performed by: ANESTHESIOLOGY

## 2017-11-03 PROCEDURE — 72070 X-RAY EXAM THORAC SPINE 2VWS: CPT | Mod: 26,,, | Performed by: RADIOLOGY

## 2017-11-03 PROCEDURE — 99213 OFFICE O/P EST LOW 20 MIN: CPT | Mod: PBBFAC,25 | Performed by: ANESTHESIOLOGY

## 2017-11-03 PROCEDURE — 99999 PR PBB SHADOW E&M-EST. PATIENT-LVL III: CPT | Mod: PBBFAC,,, | Performed by: ANESTHESIOLOGY

## 2017-11-03 PROCEDURE — 72070 X-RAY EXAM THORAC SPINE 2VWS: CPT | Mod: TC

## 2017-11-03 PROCEDURE — 20553 NJX 1/MLT TRIGGER POINTS 3/>: CPT | Mod: S$PBB | Performed by: ANESTHESIOLOGY

## 2017-11-03 PROCEDURE — 99999 PR STA SHADOW: CPT | Mod: PBBFAC,,,

## 2017-11-03 RX ORDER — TRIAMCINOLONE ACETONIDE 40 MG/ML
40 INJECTION, SUSPENSION INTRA-ARTICULAR; INTRAMUSCULAR ONCE
Status: COMPLETED | OUTPATIENT
Start: 2017-11-03 | End: 2017-11-03

## 2017-11-03 RX ORDER — BUPIVACAINE HYDROCHLORIDE 2.5 MG/ML
10 INJECTION, SOLUTION EPIDURAL; INFILTRATION; INTRACAUDAL ONCE
Status: COMPLETED | OUTPATIENT
Start: 2017-11-03 | End: 2017-11-03

## 2017-11-03 RX ADMIN — BUPIVACAINE HYDROCHLORIDE 25 MG: 2.5 INJECTION, SOLUTION EPIDURAL; INFILTRATION; INTRACAUDAL; PERINEURAL at 04:11

## 2017-11-03 RX ADMIN — TRIAMCINOLONE ACETONIDE 40 MG: 40 INJECTION, SUSPENSION INTRA-ARTICULAR; INTRAMUSCULAR at 04:11

## 2017-11-03 NOTE — LETTER
November 3, 2017      Christopher Blandon MD  [address]  [facility] [address]           [facility]  [address] [place] [id number]  Phone: [phone]  Fax: [phone]          Patient: Zenia Willams   MR Number: [id number]   YOB: 1940   Date of Visit: 11/3/2017       Dear Dr. Christopher Blandon:    Thank you for referring Zenia Willams to me for evaluation. Attached you will find relevant portions of my assessment and plan of care.    If you have questions, please do not hesitate to call me. I look forward to following Zenia Willams along with you.    Sincerely,    Leander Turner MD    Enclosure  CC:  No Recipients    If you would like to receive this communication electronically, please contact [email or web address] or [phone] to request more information on [employer] access.    For providers and/or their staff who would like to refer a patient to [facility], please contact us through our one-stop-shop provider referral line, [facility], at [phone].    If you feel you have received this communication in error or would no longer like to receive these types of communications, please e-mail [email or web address]

## 2017-11-03 NOTE — PROGRESS NOTES
Chronic Pain - New Consult    Referring Physician: Christopher Blandon MD    Chief Complaint:   Chief Complaint   Patient presents with    Back Pain        SUBJECTIVE:    Zenia Willams presents to the clinic for the evaluation of back pain. The pain started 3  years ago and symptoms have been worsening.The pain is located in the upper back area.  The pain is described as aching and pulsating and is rated as 4/10. The pain is rated with a score of  0/10 on the BEST day and a score of 9/10 on the WORST day.  Symptoms interfere with daily activity and sleeping. The pain is exacerbated by Standing, Bending, Extension, Flexing, Lifting and Getting out of bed/chair.  The pain is mitigated by heat. She reports spending 2 hours per day reclining. The patient reports 6 hours of uninterrupted sleep per night.    She has hx of L5  laminectomy many years ago. The pain is mostly axial  low back, but she also has left sided mid-back pain which bothers her the most     Patient denies night fever/night sweats, urinary incontinence, bowel incontinence, significant weight loss, significant motor weakness and loss of sensations.    Physical Therapy/Home Exercise: no      Pain Disability Index Review:  No flowsheet data found.    Pain Medications:    - Opioids: none  - Adjuvant Medications: celebrex  - Anti-Coagulants: Aspirin  - Others: see medication list     report:  Reviewed and consistent with medication use as prescribed.    Pain Procedures: back surgery 30 years ago    Imagin2017MRI Lumbar Spine Without Contrast    Narrative     Exam: [id number] 17  12:28:58 [id number] ([facility]) : MRI LUMBAR SPINE WITHOUT CONTRAST    Technique:    Multiplanar and multisequence MRI of the lumbar spine was performed without intravenous contrast.    Comparison:    2012    Findings:      There is mild prominence of the normal lumbar lordosis.  The bone marrow signal is within normal limits.  The vertebral body heights are  maintained.    The conus medullaris terminates at the level of L1.  The cauda equina nerve roots are within normal limits.  There are no intraspinal collections.    There is disc desiccation at multiple levels.  Evaluation of individual levels reveals the following:    L1-L2, there is minimal disc bulge along with facet hypertrophy and ligamentum flavum hypertrophy.  The spinal canal and the neural foraminal are within normal limits.    L2-L3, there is minimal disc bulge along with facet hypertrophy and ligamentum flavum hypertrophy. The spinal canal and the neural foraminal are within normal limits.    L3-4, there is minimal discoid along with facet hypertrophy and ligamentum flavum hypertrophy.  The spinal canal and the neural foraminal are within normal limits.    L4-5, there is minimal discoid along with facet hypertrophy and ligamentum flavum.  The spinal canal and the neural foraminal are within normal limits.    L5-S1, there is suggestion postlaminectomy changes at this level.  The spinal canal and the neural foraminal are within normal limits.    There is a simple cyst in the right kidney.  There is no evidence of lymphadenopathy in the retroperitoneum.  The reminder of the visualized paraspinal soft tissues are within normal limits.   Impression       Postlaminectomy changes at the L5 level.     Mild multilevel degenerative changes of the lumbar spine.    No evidence of spinal canal or neural foraminal narrowing.    No intraspinal lesion to suggest cauda equina type lesion in the spinal canal.     09/01/2017 X-Ray Thoracic Spine AP Lateral       Past Medical History:   Diagnosis Date    Arthritis     Hypertension     Other and unspecified hyperlipidemia     Thyroid disease     Urinary incontinence      Past Surgical History:   Procedure Laterality Date    APPENDECTOMY      APPENDECTOMY      BACK SURGERY      BELPHAROPTOSIS REPAIR      BLADDER SURGERY      CHOLECYSTECTOMY      HYSTERECTOMY       "SPINE SURGERY      TONSILLECTOMY       Social History     Social History    Marital status:      Spouse name: N/A    Number of children: N/A    Years of education: N/A     Occupational History    Not on file.     Social History Main Topics    Smoking status: Former Smoker     Packs/day: 0.25     Years: 2.00     Types: Cigarettes     Quit date: 1/1/1962    Smokeless tobacco: Never Used    Alcohol use Yes      Comment: rarely    Drug use: Unknown    Sexual activity: Not on file     Other Topics Concern    Not on file     Social History Narrative    No narrative on file     No family history on file.    Review of patient's allergies indicates:   Allergen Reactions    Versed [midazolam] Anaphylaxis     Patient states that she "coded"       Current Outpatient Prescriptions   Medication Sig    aspirin (ECOTRIN) 81 MG EC tablet Take 81 mg by mouth once daily.    celecoxib (CELEBREX) 200 MG capsule Take 200 mg by mouth 2 (two) times daily.    denosumab (PROLIA) 60 mg/mL Syrg Inject 60 mg into the skin every 6 (six) months.     dicyclomine (BENTYL) 20 mg tablet Take 20 mg by mouth every 6 (six) hours as needed.     irbesartan (AVAPRO) 150 MG tablet Take 1 tablet by mouth once daily.    levothyroxine (SYNTHROID) 50 MCG tablet Take 50 mcg by mouth once daily.    omeprazole (PRILOSEC) 40 MG capsule TK 1 C PO ONCE D    oxycodone-acetaminophen (PERCOCET)  mg per tablet TK 1 T PO QID PRN    tramadol (ULTRAM) 50 mg tablet Take 1 tablet by mouth every 8 (eight) hours as needed.    trazodone (DESYREL) 50 MG tablet Take 100 mg by mouth every evening.     No current facility-administered medications for this visit.        REVIEW OF SYSTEMS:    GENERAL:  No weight loss, malaise or fevers.  HEENT:+ migraine headaches.  NECK:  Negative for lumps, goiter, pain and significant neck swelling.  RESPIRATORY:  Negative for cough, wheezing or shortness of breath.  CARDIOVASCULAR:  Negative for chest pain, leg " "swelling or palpitations.  GI:  Negative for abdominal discomfort, blood in stools or black stools or change in bowel habits.  MUSCULOSKELETAL:  See HPI.  SKIN:  Negative for lesions, rash, and itching.  PSYCH:  Negative for sleep disturbance, mood disorder and recent psychosocial stressors.  HEMATOLOGY/LYMPHOLOGY:  Negative for prolonged bleeding, bruising easily or swollen nodes.  NEURO:   No history of headaches, syncope, paralysis, seizures or tremors.  All other reviewed and negative other than HPI.    OBJECTIVE:    /62 (BP Location: Left arm, Patient Position: Sitting, BP Method: Medium (Automatic))   Pulse 74   Resp 18   Ht 5' 4" (1.626 m)   Wt 61.4 kg (135 lb 5.8 oz)   BMI 23.23 kg/m²     PHYSICAL EXAMINATION:    General appearance: Well appearing, in no acute distress, alert and oriented x3.  Psych:  Mood and affect appropriate.  Skin: Skin color, texture, turgor normal, no rashes or lesions, in both upper and lower body.  Head/face:  Normocephalic, atraumatic. No palpable lymph nodes.  Neck: + pain to palpation over the left trapeius,  And cervical paraspinous muscles. Spurling Negative. No pain with neck flexion, extension, or lateral flexion.   Cor: RRR  Pulm: CTA  Back:+ TTP over the left thoracic paraspinal msucle and mid throracic spine   Straight leg raising in the sitting and supine positions is negative to radicular pain. No pain to palpation over the spine or costovertebral angles. Normal range of motion without pain reproduction.  Extremities: Peripheral joint ROM is full and pain free without obvious instability or laxity in all four extremities. No deformities, edema, or skin discoloration. Good capillary refill.  Musculoskeletal: Shoulder, hip, sacroiliac and knee provocative maneuvers are negative. Bilateral upper and lower extremity strength is normal and symmetric.  No atrophy or tone abnormalities are noted.  Neuro: Bilateral upper and lower extremity coordination and muscle " stretch reflexes are physiologic and symmetric.  Plantar response are downgoing. No loss of sensation is noted.  Gait: normal.    ASSESSMENT: 77 y.o. year old female with left sided neck, thoracic and low back pain, consistent with      1. Myofascial muscle pain    2. Chronic left-sided thoracic back pain    3. Thoracic spondyloarthritis      I think her pain is mostly myofascial in nature, however thoracic facet syndrome cannot be ruled out.     PLAN:     - I have stressed the importance of physical activity and a home exercise plan to help with pain and improve health.  -Will perform TPI of the left trapezius and left thoracic paraspinal muscles  -Consult to PT   - RTC in 2 months   - Counseled patient regarding the importance of activity modification, constant sleeping habits and physical therapy.    The above plan and management options were discussed at length with patient. Patient is in agreement with the above and verbalized understanding. It will be communicated with the referring physician via electronic record, fax, or mail.    Leander ISAAC Johnny  11/03/2017         INFORMED CONSENT: The procedure, risks, benefits and options were discussed with patient. There are no contraindications to the procedure. The patient expressed understanding and agreed to proceed. The personnel performing the procedure was discussed. I verify that I personally obtained Zenia's consent prior to the start of the procedure and the signed consent can be found on the patient's chart.        PROCEDURE: TRIGGER POINT INJECTION  The patient was placed in a seated position. The site of pain and procedure were confirmed with the patient prior to starting the procedure. The patient's  trigger points were identified and marked. The skin was prepped with chlorhexidine three times.  A 25-gauge 1.5 inch  needle was advanced through the skin and subcutaneous tissues.  Aspiration for blood, air and CSF was negative.  A total of 10 ml of Bupivacaine  0.25% and 40 mg Kenalog  was injected at all trigger points.  No complications were evident. No specimens collected.              Leander Turner MD  11/3/2017

## 2019-03-27 PROBLEM — R13.14 DYSPHAGIA, PHARYNGOESOPHAGEAL: Status: ACTIVE | Noted: 2019-03-27

## 2019-06-05 PROBLEM — I20.9 ANGINA, CLASS III: Status: ACTIVE | Noted: 2019-06-05

## 2019-06-05 PROBLEM — E78.5 DYSLIPIDEMIA: Status: ACTIVE | Noted: 2019-06-05

## 2019-06-05 PROBLEM — I25.10 CAD (CORONARY ARTERY DISEASE): Status: ACTIVE | Noted: 2019-06-05

## 2019-06-05 PROBLEM — I34.0 MITRAL REGURGITATION: Status: ACTIVE | Noted: 2019-06-05

## 2019-10-07 PROBLEM — I34.0 NON-RHEUMATIC MITRAL REGURGITATION: Status: ACTIVE | Noted: 2019-10-07

## 2020-01-14 PROBLEM — I50.32 CHRONIC DIASTOLIC HEART FAILURE, NYHA CLASS 3: Status: ACTIVE | Noted: 2020-01-14

## 2021-01-14 ENCOUNTER — IMMUNIZATION (OUTPATIENT)
Dept: FAMILY MEDICINE | Facility: CLINIC | Age: 81
End: 2021-01-14
Payer: MEDICARE

## 2021-01-14 DIAGNOSIS — Z23 NEED FOR VACCINATION: ICD-10-CM

## 2021-01-14 PROCEDURE — 91300 COVID-19, MRNA, LNP-S, PF, 30 MCG/0.3 ML DOSE VACCINE: CPT | Mod: PBBFAC | Performed by: FAMILY MEDICINE

## 2021-02-04 ENCOUNTER — IMMUNIZATION (OUTPATIENT)
Dept: FAMILY MEDICINE | Facility: CLINIC | Age: 81
End: 2021-02-04
Payer: MEDICARE

## 2021-02-04 DIAGNOSIS — Z23 NEED FOR VACCINATION: Primary | ICD-10-CM

## 2021-02-04 PROCEDURE — 91300 COVID-19, MRNA, LNP-S, PF, 30 MCG/0.3 ML DOSE VACCINE: CPT | Mod: PBBFAC | Performed by: FAMILY MEDICINE

## 2021-02-04 PROCEDURE — 0002A COVID-19, MRNA, LNP-S, PF, 30 MCG/0.3 ML DOSE VACCINE: CPT | Mod: PBBFAC | Performed by: FAMILY MEDICINE

## 2022-02-10 PROBLEM — R79.89 LOW SERUM CORTISOL LEVEL: Status: ACTIVE | Noted: 2022-02-10

## 2022-10-31 PROBLEM — R13.12 OROPHARYNGEAL DYSPHAGIA: Status: ACTIVE | Noted: 2022-10-31

## 2023-07-03 PROBLEM — R63.30 FEEDING DIFFICULTY: Status: ACTIVE | Noted: 2023-07-03

## 2023-07-03 PROBLEM — R13.10 DYSPHAGIA: Status: ACTIVE | Noted: 2023-07-03

## 2023-12-08 PROBLEM — M54.2 NECK PAIN: Status: ACTIVE | Noted: 2023-12-08

## 2023-12-09 PROBLEM — E43 SEVERE MALNUTRITION: Status: RESOLVED | Noted: 2023-12-09 | Resolved: 2023-12-09

## 2023-12-09 PROBLEM — E43 SEVERE MALNUTRITION: Status: ACTIVE | Noted: 2023-12-09
